# Patient Record
Sex: MALE | Race: WHITE | URBAN - NONMETROPOLITAN AREA
[De-identification: names, ages, dates, MRNs, and addresses within clinical notes are randomized per-mention and may not be internally consistent; named-entity substitution may affect disease eponyms.]

---
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
## 2022-01-11 ENCOUNTER — OFFICE VISIT (OUTPATIENT)
Dept: FAMILY MEDICINE CLINIC | Age: 22
End: 2022-01-11
Payer: COMMERCIAL

## 2022-01-11 VITALS
SYSTOLIC BLOOD PRESSURE: 112 MMHG | RESPIRATION RATE: 16 BRPM | DIASTOLIC BLOOD PRESSURE: 70 MMHG | HEIGHT: 68 IN | HEART RATE: 69 BPM | OXYGEN SATURATION: 98 % | WEIGHT: 193 LBS | TEMPERATURE: 98.3 F | BODY MASS INDEX: 29.25 KG/M2

## 2022-01-11 DIAGNOSIS — K64.4 EXTERNAL HEMORRHOIDS: ICD-10-CM

## 2022-01-11 DIAGNOSIS — Z00.00 ENCOUNTER FOR MEDICAL EXAMINATION TO ESTABLISH CARE: Primary | ICD-10-CM

## 2022-01-11 DIAGNOSIS — R11.0 DAILY NAUSEA: ICD-10-CM

## 2022-01-11 DIAGNOSIS — R35.89 POLYURIA: ICD-10-CM

## 2022-01-11 DIAGNOSIS — R63.4 WEIGHT LOSS: ICD-10-CM

## 2022-01-11 DIAGNOSIS — R11.2 NON-INTRACTABLE VOMITING WITH NAUSEA, UNSPECIFIED VOMITING TYPE: ICD-10-CM

## 2022-01-11 LAB
BILIRUBIN URINE: NEGATIVE
BILIRUBIN, POC: NEGATIVE
BLOOD URINE, POC: NEGATIVE
BLOOD URINE, POC: NEGATIVE
CHARACTER, URINE: CLEAR
CLARITY, POC: CLEAR
COLOR, POC: YELLOW
COLOR, URINE: YELLOW
GLUCOSE URINE, POC: NEGATIVE
GLUCOSE URINE: NEGATIVE MG/DL
KETONES, POC: NEGATIVE
KETONES, URINE: NEGATIVE
LEUKOCYTE CLUMPS, URINE: NEGATIVE
LEUKOCYTE EST, POC: NEGATIVE
NITRITE, POC: NEGATIVE
NITRITE, URINE: NEGATIVE
PH, POC: 7.5
PH, URINE: 7.5 (ref 5–9)
PROTEIN, POC: NEGATIVE
PROTEIN, URINE: NEGATIVE MG/DL
SPECIFIC GRAVITY, POC: 1.02
SPECIFIC GRAVITY, URINE: 1.02 (ref 1–1.03)
UROBILINOGEN, POC: 1
UROBILINOGEN, URINE: 1 EU/DL (ref 0–1)

## 2022-01-11 PROCEDURE — 99204 OFFICE O/P NEW MOD 45 MIN: CPT | Performed by: NURSE PRACTITIONER

## 2022-01-11 PROCEDURE — 81003 URINALYSIS AUTO W/O SCOPE: CPT | Performed by: NURSE PRACTITIONER

## 2022-01-11 ASSESSMENT — ENCOUNTER SYMPTOMS
CONSTIPATION: 0
SINUS PRESSURE: 0
BACK PAIN: 0
DIARRHEA: 0
VOMITING: 0
NAUSEA: 1
SINUS PAIN: 0
RHINORRHEA: 0
RESPIRATORY NEGATIVE: 1

## 2022-01-11 ASSESSMENT — PATIENT HEALTH QUESTIONNAIRE - PHQ9
SUM OF ALL RESPONSES TO PHQ QUESTIONS 1-9: 0
2. FEELING DOWN, DEPRESSED OR HOPELESS: 0
SUM OF ALL RESPONSES TO PHQ9 QUESTIONS 1 & 2: 0
SUM OF ALL RESPONSES TO PHQ QUESTIONS 1-9: 0
SUM OF ALL RESPONSES TO PHQ QUESTIONS 1-9: 0
1. LITTLE INTEREST OR PLEASURE IN DOING THINGS: 0
SUM OF ALL RESPONSES TO PHQ QUESTIONS 1-9: 0

## 2022-01-11 NOTE — PROGRESS NOTES
[facility]  [address] [place] [facility] [place] [address]  Dept: [phone]  Dept Fax: [phone]  Loc: [phone]    Heather Ford is a 25 y.o. Padma Lever presents today for his medical conditions/complaints as noted below. Lee Vicenta Aguirre c/o of New Patient (no specific concerns )      HPI:      Pt here to establish care. Pt denies any chronic illness. 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 Doing well in school. He states 2 months ago he started having some abdominal pain with nausea on a regular basis, also notes there were some days nausea so bad he could not eat or would vomit when trying to eat, states he has lost about 10-12 pounds in the last 2-3 months. Does have external hemorrhoids that flare up from time to time. he will use  A suppository for them. Does have constipation at times,          No current outpatient medications on file. No current facility-administered medications for this visit. History reviewed. No pertinent past medical history. History reviewed. No pertinent surgical history. History reviewed. No pertinent family history.   Social History     Tobacco Use    Smoking status: Current Some Day Smoker    Smokeless tobacco: Never Used   Substance Use Topics    Alcohol use: Not on file        No Known Allergies    Health Maintenance   Topic Date Due    Hepatitis C screen  Never done    Varicella vaccine (1 of 2 - 2-dose childhood series) Never done    COVID-19 Vaccine (1) Never done    HPV vaccine (1 - Male 2-dose series) Never done    Depression Screen  Never done    HIV screen  Never done    Flu vaccine (1) Never done    DTaP/Tdap/Td vaccine (1 - Tdap) Never done    Hepatitis A vaccine  Aged Out    Hepatitis B vaccine  Aged Out    Hib vaccine  Aged Out    Meningococcal (ACWY) vaccine  Aged Out    Pneumococcal 0-64 years Vaccine  Aged Out       Subjective:      Review of Systems Constitutional: Positive for unexpected weight change. Negative for chills, fatigue and fever. HENT: Negative for congestion, nosebleeds, postnasal drip, rhinorrhea, sinus pressure and sinus pain. Respiratory: Negative. Cardiovascular: Negative. Gastrointestinal: Positive for nausea. Negative for constipation, diarrhea and vomiting. Endocrine: Positive for polyphagia and polyuria. Genitourinary: Negative for difficulty urinating and dysuria. Musculoskeletal: Negative for arthralgias, back pain and myalgias. Skin: Negative. Neurological: Negative for dizziness, facial asymmetry, weakness and headaches. Psychiatric/Behavioral: Negative for self-injury, sleep disturbance and suicidal ideas. Objective:      /70   Pulse 69   Temp 98.3 °F (36.8 °C) (Oral)   Resp 16   Ht 5' 8\" (1.727 m)   Wt 193 lb (87.5 kg)   SpO2 98%   BMI 29.35 kg/m²      Physical Exam  Vitals and nursing note reviewed. Constitutional:       Appearance: He is not ill-appearing. HENT:      Right Ear: Tympanic membrane, ear canal and external ear normal.      Left Ear: Tympanic membrane, ear canal and external ear normal.      Nose: Nose normal.      Mouth/Throat:      Mouth: Mucous membranes are moist.   Eyes:      Pupils: Pupils are equal, round, and reactive to light. Cardiovascular:      Rate and Rhythm: Normal rate and regular rhythm. Pulses: Normal pulses. Heart sounds: Normal heart sounds. No murmur heard. Pulmonary:      Effort: Pulmonary effort is normal. No respiratory distress. Breath sounds: Normal breath sounds. No wheezing. Abdominal:      General: Abdomen is flat. Bowel sounds are normal. There is no distension. Palpations: Abdomen is soft. Tenderness: There is no abdominal tenderness. Musculoskeletal:         General: Normal range of motion. Skin:     General: Skin is warm and dry. Capillary Refill: Capillary refill takes less than 2 seconds. Neurological:      General: No focal deficit present. Mental Status: He is alert and oriented to person, place, and time. Psychiatric:         Mood and Affect: Mood normal.         Behavior: Behavior normal.         Thought Content: Thought content normal.         Judgment: Judgment normal.          Assessment/Plan:           1. Encounter for medical examination to establish care      2. External hemorrhoids  Avoid constipation  Stool softeners prn    3. Polyuria  R/o diabetes vs uti  - Basic Metabolic Panel; Future  - Amylase; Future  - Lipase; Future  - POCT Urinalysis No Micro (Auto)-normal     4. Daily nausea  R/o h pylori vs ulcer vs gerd  - H. Pylori Antigen, Stool; Future  - CBC With Auto Differential; Future  - Basic Metabolic Panel; Future  - Amylase; Future  - Lipase; Future    5. Non-intractable vomiting with nausea, unspecified vomiting type  #4  - Basic Metabolic Panel; Future    6. Weight loss  #4  - Occult blood x 1, stool; Future  - CBC With Auto Differential; Future  If testing normal will start KRISTIAN medications and refer to GI  Pt in agreement with plan     Return if symptoms worsen or fail to improve. Reccommended tobaccocessation options including pharmacologic methods, counseled great than 3 minutesduring this visit:  Yes[]  No  []       Patient given educational materials -see patient instructions. Discussed use, benefit, and side effects of prescribedmedications. All patient questions answered. Pt voiced understanding. Reviewedhealth maintenance. Instructed to continue current medications, diet and exercise. Patient agreed with treatment plan. Follow up as directed.        Electronicallysigned by EFRA Munoz CNP on 1/11/2022 at 3:32 PM